# Patient Record
Sex: MALE | Race: WHITE | NOT HISPANIC OR LATINO | URBAN - METROPOLITAN AREA
[De-identification: names, ages, dates, MRNs, and addresses within clinical notes are randomized per-mention and may not be internally consistent; named-entity substitution may affect disease eponyms.]

---

## 2017-10-11 ENCOUNTER — IMPORTED ENCOUNTER (OUTPATIENT)
Dept: URBAN - METROPOLITAN AREA CLINIC 43 | Facility: CLINIC | Age: 72
End: 2017-10-11

## 2017-10-11 PROBLEM — H17.89: Noted: 2017-10-11

## 2017-10-11 PROBLEM — H26.491: Noted: 2017-10-11

## 2018-11-16 ENCOUNTER — IMPORTED ENCOUNTER (OUTPATIENT)
Dept: URBAN - METROPOLITAN AREA CLINIC 43 | Facility: CLINIC | Age: 73
End: 2018-11-16

## 2018-11-16 PROBLEM — H26.491: Noted: 2018-11-16

## 2020-04-19 ASSESSMENT — KERATOMETRY
OD_AXISANGLE_DEGREES: 106
OD_K2POWER_DIOPTERS: 42.25
OD_K2POWER_DIOPTERS: 41.75
OD_AXISANGLE_DEGREES: 100
OS_AXISANGLE_DEGREES: 64
OD_K1POWER_DIOPTERS: 43.5
OS_AXISANGLE_DEGREES: 40
OS_K1POWER_DIOPTERS: 42.25
OS_AXISANGLE2_DEGREES: 130
OD_AXISANGLE2_DEGREES: 16
OS_K2POWER_DIOPTERS: 42
OD_AXISANGLE2_DEGREES: 10
OD_K1POWER_DIOPTERS: 44
OS_K1POWER_DIOPTERS: 42.25
OS_K2POWER_DIOPTERS: 41.75
OS_AXISANGLE2_DEGREES: 154

## 2020-04-19 ASSESSMENT — VISUAL ACUITY
OS_SC: 20/20
OS_SC: 20/20-1
OD_SC: J2
OD_SC: J1+
OD_SC: 20/25 -2
OD_OTHER: 20/50.
OD_SC: 20/25
OD_OTHER: 20/400.
OS_OTHER: 20/50.
OS_SC: J5
OS_SC: J2

## 2020-04-19 ASSESSMENT — TONOMETRY
OS_IOP_MMHG: 13.0
OS_IOP_MMHG: 18.0
OD_IOP_MMHG: 11.0
OD_IOP_MMHG: 12.0

## 2020-11-05 NOTE — PATIENT DISCUSSION
No holes/breaks/tears/detachment seen on detailed exam today. RD precautions discussed. Call office if worsening floaters, persistent and worsening flashes, or curtain of vision loss.

## 2023-04-11 NOTE — PATIENT DISCUSSION
Discussed condition and exacerbating conditions/situations (e.g., dry/arid environments, overhead fans, air conditioners, side effect of medications).
Discussed lid hygiene, warm compress and eyelid wash.
Glasses Prescription given to patient.
Good postoperative appearance.
Monitor.
No Retinal holes, Tears or Detachments.
No holes/breaks/tears/detachment seen on detailed exam today. RD precautions discussed. Call office if worsening floaters, persistent and worsening flashes, or curtain of vision loss.
Patient made aware of 24/7 emergency services.
Patient understands condition, prognosis and need for follow up care.
Recommended artificial tears to use: 1 drop 4x a day in both eyes.
Retinal tear and detachment warning symptoms reviewed and patient instructed to call immediately if increasing floaters, flashes, or decreasing peripheral vision.
Verbal/written post procedure instructions were given to patient/caregiver./Instructed patient/caregiver to follow-up with primary care physician./Instructed patient/caregiver regarding signs and symptoms of infection./Keep the cast/splint/dressing clean and dry.